# Patient Record
Sex: FEMALE | Race: WHITE | NOT HISPANIC OR LATINO | Employment: OTHER | URBAN - METROPOLITAN AREA
[De-identification: names, ages, dates, MRNs, and addresses within clinical notes are randomized per-mention and may not be internally consistent; named-entity substitution may affect disease eponyms.]

---

## 2017-08-28 PROBLEM — N95.9 POSTMENOPAUSAL SYMPTOMS: Status: ACTIVE | Noted: 2017-08-28

## 2017-08-28 PROBLEM — G89.29 CHRONIC MIDLINE LOW BACK PAIN WITHOUT SCIATICA: Status: ACTIVE | Noted: 2017-08-28

## 2017-08-28 PROBLEM — R04.0 EPISTAXIS, RECURRENT: Status: ACTIVE | Noted: 2017-08-28

## 2017-08-28 PROBLEM — K64.9 HEMORRHOIDS: Status: ACTIVE | Noted: 2017-08-28

## 2017-08-28 PROBLEM — M54.50 CHRONIC MIDLINE LOW BACK PAIN WITHOUT SCIATICA: Status: ACTIVE | Noted: 2017-08-28

## 2017-08-28 PROBLEM — E78.5 DYSLIPIDEMIA: Status: ACTIVE | Noted: 2017-08-28

## 2017-08-28 PROBLEM — K57.30 DIVERTICULOSIS OF LARGE INTESTINE WITHOUT HEMORRHAGE: Status: ACTIVE | Noted: 2017-08-28

## 2017-08-28 PROBLEM — I10 HYPERTENSION, ESSENTIAL: Status: ACTIVE | Noted: 2017-08-28

## 2017-08-28 PROBLEM — E55.9 VITAMIN D DEFICIENCY: Status: ACTIVE | Noted: 2017-08-28

## 2017-08-28 PROBLEM — Z00.00 HEALTHCARE MAINTENANCE: Status: ACTIVE | Noted: 2017-08-28

## 2017-08-28 PROBLEM — M26.609 TMJ DISEASE: Status: ACTIVE | Noted: 2017-08-28

## 2021-02-06 ENCOUNTER — APPOINTMENT (OUTPATIENT)
Dept: RADIOLOGY | Facility: MEDICAL CENTER | Age: 71
End: 2021-02-06
Attending: EMERGENCY MEDICINE
Payer: MEDICARE

## 2021-02-06 ENCOUNTER — HOSPITAL ENCOUNTER (EMERGENCY)
Facility: MEDICAL CENTER | Age: 71
End: 2021-02-06
Attending: EMERGENCY MEDICINE
Payer: MEDICARE

## 2021-02-06 VITALS
SYSTOLIC BLOOD PRESSURE: 168 MMHG | WEIGHT: 130.73 LBS | OXYGEN SATURATION: 97 % | HEIGHT: 60 IN | BODY MASS INDEX: 25.67 KG/M2 | RESPIRATION RATE: 15 BRPM | DIASTOLIC BLOOD PRESSURE: 69 MMHG | HEART RATE: 66 BPM | TEMPERATURE: 97.5 F

## 2021-02-06 DIAGNOSIS — S52.531A CLOSED COLLES' FRACTURE OF RIGHT RADIUS, INITIAL ENCOUNTER: ICD-10-CM

## 2021-02-06 PROCEDURE — 73110 X-RAY EXAM OF WRIST: CPT | Mod: RT

## 2021-02-06 PROCEDURE — 302874 HCHG BANDAGE ACE 2 OR 3""

## 2021-02-06 PROCEDURE — A9270 NON-COVERED ITEM OR SERVICE: HCPCS | Performed by: EMERGENCY MEDICINE

## 2021-02-06 PROCEDURE — 29125 APPL SHORT ARM SPLINT STATIC: CPT

## 2021-02-06 PROCEDURE — 700102 HCHG RX REV CODE 250 W/ 637 OVERRIDE(OP): Performed by: EMERGENCY MEDICINE

## 2021-02-06 PROCEDURE — 99284 EMERGENCY DEPT VISIT MOD MDM: CPT

## 2021-02-06 RX ORDER — ACETAMINOPHEN 325 MG/1
650 TABLET ORAL ONCE
Status: COMPLETED | OUTPATIENT
Start: 2021-02-06 | End: 2021-02-06

## 2021-02-06 RX ADMIN — ACETAMINOPHEN 650 MG: 325 TABLET, FILM COATED ORAL at 17:30

## 2021-02-06 SDOH — HEALTH STABILITY: MENTAL HEALTH: HOW OFTEN DO YOU HAVE A DRINK CONTAINING ALCOHOL?: NEVER

## 2021-02-06 ASSESSMENT — LIFESTYLE VARIABLES
HAVE YOU EVER FELT YOU SHOULD CUT DOWN ON YOUR DRINKING: NO
EVER FELT BAD OR GUILTY ABOUT YOUR DRINKING: NO
HAVE PEOPLE ANNOYED YOU BY CRITICIZING YOUR DRINKING: NO
TOTAL SCORE: 0
EVER HAD A DRINK FIRST THING IN THE MORNING TO STEADY YOUR NERVES TO GET RID OF A HANGOVER: NO
CONSUMPTION TOTAL: INCOMPLETE
TOTAL SCORE: 0
TOTAL SCORE: 0
DOES PATIENT WANT TO STOP DRINKING: NO
DO YOU DRINK ALCOHOL: NO

## 2021-02-07 NOTE — DISCHARGE INSTRUCTIONS
Stay in splint.  Ice and elevate.  Motrin and/or Tylenol for pain.  Please follow-up with Dr. Archer early this week.

## 2021-02-07 NOTE — ED NOTES
Instructions to patient on splint care and follow up needs. Steady gate, axox4, noted friend at bedside for ride home

## 2021-02-07 NOTE — ED TRIAGE NOTES
Chief Complaint   Patient presents with   • T-5000 FALL     ambulates to triage c/o pain in right wrist after she tripped and fell.pt states she can feel something squeeking in her right wrist     Arrived w/acewrap, pt guarding right wrist. Educated on triage process. Instructed to notify staff for any worsening symptoms. Denies any recent travel. Denies exposure to known covid positive patients. Denies any respiratory symptoms.

## 2021-02-07 NOTE — ED PROVIDER NOTES
CHIEF COMPLAINT  Chief Complaint   Patient presents with   • T-5000 FALL     ambulates to triage c/o pain in right wrist after she tripped and fell.pt states she can feel something squeeking in her right wrist   • Wrist Pain     right wrist after the fall       HPI  Lsiy Costa is a 70 y.o. female who presents after she tripped on a sidewalk and landed on her right hand.  She sustained some bruising to her ankle and primarily has pain in her right wrist.  She notes no weakness or numbness.  No head injury or neck injury.  No chest pain or abdominal pain.  Nothing seems to make her symptoms better.  She is not taking any blood thinners and has no past medical history.    REVIEW OF SYSTEMS  All other systems are negative.     PAST MEDICAL HISTORY  History reviewed. No pertinent past medical history.    FAMILY HISTORY  Family History   Problem Relation Age of Onset   • Other Mother         Sudden death age 56 question valvular heart disease   • Other Father         Accident age 56       SOCIAL HISTORY  Social History     Socioeconomic History   • Marital status: Not on file     Spouse name: Not on file   • Number of children: Not on file   • Years of education: Not on file   • Highest education level: Not on file   Occupational History   • Not on file   Social Needs   • Financial resource strain: Not on file   • Food insecurity     Worry: Not on file     Inability: Not on file   • Transportation needs     Medical: Not on file     Non-medical: Not on file   Tobacco Use   • Smoking status: Never Smoker   • Smokeless tobacco: Never Used   Substance and Sexual Activity   • Alcohol use: Never     Frequency: Never   • Drug use: Never   • Sexual activity: Not on file   Lifestyle   • Physical activity     Days per week: Not on file     Minutes per session: Not on file   • Stress: Not on file   Relationships   • Social connections     Talks on phone: Not on file     Gets together: Not on file     Attends Denominational  service: Not on file     Active member of club or organization: Not on file     Attends meetings of clubs or organizations: Not on file     Relationship status: Not on file   • Intimate partner violence     Fear of current or ex partner: Not on file     Emotionally abused: Not on file     Physically abused: Not on file     Forced sexual activity: Not on file   Other Topics Concern   • Not on file   Social History Narrative   • Not on file       SURGICAL HISTORY  Past Surgical History:   Procedure Laterality Date   • MASTOIDECTOMY     • TONSILLECTOMY AND ADENOIDECTOMY         CURRENT MEDICATIONS  Home Medications     Reviewed by Naz Zuleta R.N. (Registered Nurse) on 02/06/21 at 1627  Med List Status: Complete   Medication Last Dose Status        Patient Celio Taking any Medications                       ALLERGIES  No Known Allergies    PHYSICAL EXAM  VITAL SIGNS: BP (!) 168/69   Pulse 66   Temp 36.4 °C (97.5 °F) (Temporal)   Resp 15   Ht 1.524 m (5')   Wt 59.3 kg (130 lb 11.7 oz)   SpO2 97%   BMI 25.53 kg/m²      Constitutional: Well developed, Well nourished, No acute distress, Non-toxic appearance.   HENT: Normocephalic, Atraumatic,  mucous membranes moist, no erythema, exudates, swelling, or masses, nares patent  Eyes: nonicteric  Neck: Supple, no meningismus  Lymphatic: No lymphadenopathy noted.   Cardiovascular: Regular pulses  Lungs: No respiratory distress  Skin: Warm, Dry, no rash  Back: No tenderness, No CVA tenderness.   Genitalia: Deferred  Rectal: Deferred  Extremities: Right wrist demonstrates soft tissue swelling primarily volar,  is limited by pain but otherwise intact as well as extension, radial pulse 2+, cap refill is normal, sensation intact in all digits, there is no proximal forearm or elbow or shoulder pain, right lower extremity demonstrates some soft tissue swelling and ecchymosis laterally but there is no bony tenderness throughout, no tenderness at the navicular no fifth  metatarsal tenderness and no calcaneal tenderness, Achilles tendon is intact, DP PT pulses 2+, flexion extension 5 out of 5 and sensation is intact  Neurologic: Alert, appropriate, follows commands, moving all extremities, normal speech   Psychiatric: Affect normal    RADIOLOGY/PROCEDURES  DX-WRIST-COMPLETE 3+ RIGHT   Final Result      1.  Impacted, comminuted and dorsally angulated fracture of the distal radius. This extends to the articular surface.      2.  Well-corticated ossification adjacent to the ulnar styloid possibly representing sequelae of old trauma.               COURSE & MEDICAL DECISION MAKING  Pertinent Labs & Imaging studies reviewed. (See chart for details)  This is a 70-year-old female who presents with a comminuted fracture of the distal radius with dorsal angulation.  The case was reviewed with Dr. Archer, orthopedics, who reviewed the x-rays himself.  He stated the patient did not need any reduction here and I will have the patient follow-up with his group this week.  The patient will be splinted.  Her pain appears well controlled.  At this point I will not write for any narcotic medication.  The patient is neurovascularly intact.    FINAL IMPRESSION  1.  Distal radius fracture  2.   3.         Electronically signed by: Jarett Lockhart M.D., 2/6/2021 5:06 PM

## 2021-02-09 ENCOUNTER — HOSPITAL ENCOUNTER (OUTPATIENT)
Facility: MEDICAL CENTER | Age: 71
End: 2021-02-09
Attending: ANESTHESIOLOGY
Payer: MEDICARE

## 2021-02-09 LAB
SARS-COV+SARS-COV-2 AG RESP QL IA.RAPID: NOTDETECTED
SPECIMEN SOURCE: NORMAL

## 2021-02-09 PROCEDURE — 87426 SARSCOV CORONAVIRUS AG IA: CPT

## 2021-03-03 DIAGNOSIS — Z23 NEED FOR VACCINATION: ICD-10-CM
